# Patient Record
Sex: FEMALE | Race: WHITE | NOT HISPANIC OR LATINO | URBAN - METROPOLITAN AREA
[De-identification: names, ages, dates, MRNs, and addresses within clinical notes are randomized per-mention and may not be internally consistent; named-entity substitution may affect disease eponyms.]

---

## 2017-01-06 ENCOUNTER — APPOINTMENT (RX ONLY)
Dept: URBAN - METROPOLITAN AREA CLINIC 310 | Facility: CLINIC | Age: 37
Setting detail: DERMATOLOGY
End: 2017-01-06

## 2017-01-06 DIAGNOSIS — B07.8 OTHER VIRAL WARTS: ICD-10-CM

## 2017-01-06 PROCEDURE — 11900 INJECT SKIN LESIONS </W 7: CPT

## 2017-01-06 PROCEDURE — ? OTHER

## 2017-01-06 PROCEDURE — ? INJECTION

## 2017-01-06 ASSESSMENT — LOCATION DETAILED DESCRIPTION DERM: LOCATION DETAILED: RIGHT MEDIAL PLANTAR 2ND TOE

## 2017-01-06 ASSESSMENT — LOCATION ZONE DERM: LOCATION ZONE: TOE

## 2017-01-06 ASSESSMENT — LOCATION SIMPLE DESCRIPTION DERM: LOCATION SIMPLE: PLANTAR SURFACE OF RIGHT 2ND TOE

## 2017-01-06 NOTE — PROCEDURE: INJECTION
Bill J-Code: no
Post-Care Instructions: I reviewed with the patient in detail post-care instructions. Patient understands to keep the injection sites clean and call the clinic if there is any redness, swelling or pain.
Dose Administered (Numbers Only): 0.1
Medication (1) And Associated J-Code Units: Candida Antigen
units
Units: cc
Dose Administered (Numbers Only): 0
Route: [place]
Detail Level: Detailed

## 2017-01-06 NOTE — PROCEDURE: OTHER
Detail Level: Detailed
Note Text (......Xxx Chief Complaint.): This diagnosis correlates with the
Other (Free Text): 3rd injection